# Patient Record
Sex: MALE | Race: BLACK OR AFRICAN AMERICAN | URBAN - METROPOLITAN AREA
[De-identification: names, ages, dates, MRNs, and addresses within clinical notes are randomized per-mention and may not be internally consistent; named-entity substitution may affect disease eponyms.]

---

## 2021-03-15 ENCOUNTER — OFFICE VISIT (OUTPATIENT)
Dept: INTERNAL MEDICINE CLINIC | Age: 31
End: 2021-03-15
Payer: MEDICAID

## 2021-03-15 VITALS
BODY MASS INDEX: 26.72 KG/M2 | OXYGEN SATURATION: 97 % | WEIGHT: 180.4 LBS | HEIGHT: 69 IN | SYSTOLIC BLOOD PRESSURE: 133 MMHG | RESPIRATION RATE: 18 BRPM | DIASTOLIC BLOOD PRESSURE: 85 MMHG | HEART RATE: 82 BPM | TEMPERATURE: 97.8 F

## 2021-03-15 DIAGNOSIS — N18.6 END STAGE RENAL DISEASE (HCC): ICD-10-CM

## 2021-03-15 DIAGNOSIS — E10.42 DIABETIC POLYNEUROPATHY ASSOCIATED WITH TYPE 1 DIABETES MELLITUS (HCC): ICD-10-CM

## 2021-03-15 DIAGNOSIS — E78.5 DYSLIPIDEMIA: ICD-10-CM

## 2021-03-15 DIAGNOSIS — E10.65 HYPERGLYCEMIA DUE TO TYPE 1 DIABETES MELLITUS (HCC): Primary | ICD-10-CM

## 2021-03-15 DIAGNOSIS — Z00.00 PHYSICAL EXAM: ICD-10-CM

## 2021-03-15 DIAGNOSIS — E10.3593: ICD-10-CM

## 2021-03-15 DIAGNOSIS — I10 ESSENTIAL HYPERTENSION: ICD-10-CM

## 2021-03-15 PROCEDURE — 99214 OFFICE O/P EST MOD 30 MIN: CPT | Performed by: INTERNAL MEDICINE

## 2021-03-15 RX ORDER — LABETALOL 200 MG/1
200 TABLET, FILM COATED ORAL 2 TIMES DAILY
COMMUNITY
Start: 2021-03-03

## 2021-03-15 RX ORDER — SEVELAMER CARBONATE 800 MG/1
800 TABLET, FILM COATED ORAL 2 TIMES DAILY
COMMUNITY
Start: 2021-03-03

## 2021-03-15 RX ORDER — TORSEMIDE 100 MG/1
100 TABLET ORAL 2 TIMES DAILY
COMMUNITY
Start: 2021-03-03

## 2021-03-15 RX ORDER — INSULIN GLARGINE 100 [IU]/ML
16 INJECTION, SOLUTION SUBCUTANEOUS DAILY
COMMUNITY
Start: 2021-03-08

## 2021-03-15 RX ORDER — INSULIN ASPART 100 [IU]/ML
INJECTION, SOLUTION INTRAVENOUS; SUBCUTANEOUS
COMMUNITY
Start: 2020-12-14

## 2021-03-15 NOTE — PROGRESS NOTES
[address] [place] [address] and Primary Care  [place] [address]  [address] [facility] [place] [facility] [address]  Phone:  [phone]  Fax: [phone]       Chief Complaint   Patient presents with    New Patient     history of Renal failure, HTN, COPD, elevated cholesterol, and diabetes. .      SUBJECTIVE:    Dianna Avery is a 31 y.o. male comes in as a new patient. He has had diabetes since age 7 and unfortunately developed significant amount of microvascular complications consisting of proliferative diabetic retinopathy, diabetic nephropathy, and diabetic neuropathy. His nephropathy has lead to end-stage renal disease with dialysis being initiated in July of last year. He remained on hemodialysis for initially two weeks and has been on PD thereafter. He has been adjusting the dialyzate accordingly. Unfortunately, his blood sugars have progressively increased since being on the dialyzate, which is not uncommon. He uses his cycler in the evening. There is a past history of primary hypertension as well as dyslipidemia. He has had no cardiovascular symptoms. Current Outpatient Medications   Medication Sig Dispense Refill    sevelamer carbonate (RENVELA) 800 mg tab tab Take 800 mg by mouth two (2) times a day.  Lantus Solostar U-100 Insulin 100 unit/mL (3 mL) inpn 16 Units by SubCUTAneous route daily.  labetaloL (NORMODYNE) 200 mg tablet Take 200 mg by mouth two (2) times a day.  torsemide (DEMADEX) 100 mg tablet Take 100 mg by mouth two (2) times a day.  glucose blood VI test strips (CONTOUR NEXT STRIPS) strip Use to check blood sugar 3 times a day 100 Strip 5    NovoLOG Flexpen U-100 Insulin 100 unit/mL (3 mL) inpn INJECT 8-12 UNITS subcutaneously WITH MEALS (max DOSE OF 50 units/day)      insulin NPH/insulin regular (NOVOLIN 70/30) 100 unit/mL (70-30) injection 40 Units by SubCUTAneous route.  40 U  Morning  30 U at dinner  Indications: TYPE 1 DIABETES MELLITUS      amLODIPine (NORVASC) 5 mg tablet Take 1 Tab by mouth daily. 27 Tab 2     Past Medical History:   Diagnosis Date    [facility] obstructive pulmonary disease ([facility]     Diabetes ([facility]     Hypercholesterolemia     Hypertension     Renal failure      History reviewed. No pertinent surgical history. No Known Allergies      REVIEW OF SYSTEMS:  General: negative for - chills or fever  ENT: negative for - headaches, nasal congestion or tinnitus  Respiratory: negative for - cough, hemoptysis, shortness of breath or wheezing  Cardiovascular : negative for - chest pain, edema, palpitations or shortness of breath  Gastrointestinal: negative for - abdominal pain, blood in stools, heartburn or nausea/vomiting  Genito-Urinary: no dysuria, trouble voiding, or hematuria  Musculoskeletal: negative for - gait disturbance, joint pain, joint stiffness or joint swelling  Neurological: no TIA or stroke symptoms  Hematologic: no bruises, no bleeding, no swollen glands  Integument: no lumps, mole changes, nail changes or rash  Endocrine: no malaise/lethargy or unexpected weight changes      Social History     Socioeconomic History    Marital status: UNKNOWN     Spouse name: Not on file    Number of children: Not on file    Years of education: Not on file    Highest education level: Not on file   Tobacco Use    Smoking status: Former Smoker     Packs/day: 0.50    Smokeless tobacco: Never Used   Substance and Sexual Activity    Alcohol use:  Yes     Alcohol/week: 3.0 standard drinks     Types: 3 Cans of beer per week    Drug use: No    Sexual activity: Yes     Family History   Problem Relation Age of Onset    Hypertension Mother        OBJECTIVE:    Visit Vitals  /85   Pulse 82   Temp 97.8 °F (36.6 °C) (Oral)   Resp 18   Ht 5' 9\" (1.753 m)   Wt 180 lb 6.4 oz (81.8 kg)   SpO2 97%   BMI 26.64 kg/m²     CONSTITUTIONAL: well , well nourished, appears age appropriate  EYES: perrla, eom intact  ENMT:moist mucous membranes, pharynx clear  NECK: supple. Thyroid normal  RESPIRATORY: Chest: clear to ascultation and percussion   CARDIOVASCULAR: Heart: regular rate and rhythm  GASTROINTESTINAL: Abdomen: soft, bowel sounds active  HEMATOLOGIC: no pathological lymph nodes palpated  MUSCULOSKELETAL: Extremities: no edema, pulse 1+   INTEGUMENT: No unusual rashes or suspicious skin lesions noted. Nails appear normal.  NEUROLOGIC: non-focal exam   MENTAL STATUS: alert and oriented, appropriate affect      ASSESSMENT:  1. Hyperglycemia due to type 1 diabetes mellitus ([address]    2. Proliferative diabetic retinopathy of both eyes associated with type 1 diabetes mellitus, macular edema presence unspecified ([address]    3. Diabetic polyneuropathy associated with type 1 diabetes mellitus ([address]    4. Essential hypertension    5. Dyslipidemia    6. End stage renal disease ([address]    7. Physical exam        PLAN:  1. As far as his diabetes is concerned, he will increase his Lantus from 16 to 24 units. He would titrate up every three days by 4 units until his fasting blood sugars are consistently lesser than 150.  2. He will follow up with his ophthalmologist for his proliferative diabetic retinopathy, requiring multiple injections, particularly in the area of the macula. 3. His diabetic polyneuropathy is stable, but he has an anesthetic foot most likely. 4. Blood pressure is reasonable today. 5. He remains on a statin in view of his increased cardiovascular risk.   6. He follows up with his nephrologist, but he could not give me the name of the individual.      .  Orders Placed This Encounter    HEMOGLOBIN A1C WITH EAG    APOLIPOPROTEIN B    CBC WITH AUTOMATED DIFF    LIPID PANEL    METABOLIC PANEL, COMPREHENSIVE    sevelamer carbonate (RENVELA) 800 mg tab tab    Lantus Solostar U-100 Insulin 100 unit/mL (3 mL) inpn    labetaloL (NORMODYNE) 200 mg tablet    torsemide (DEMADEX) 100 mg tablet    NovoLOG Flexpen U-100 Insulin 100 unit/mL (3 mL) inpn Follow-up and Dispositions    · Return in about 4 weeks (around 4/12/2021).            Ginna Bassett MD

## 2021-03-15 NOTE — PROGRESS NOTES
Chief Complaint   Patient presents with    New Patient     history of Renal failure, HTN, COPD, elevated cholesterol, and diabetes. 1. Have you been to the ER, urgent care clinic since your last visit? Hospitalized since your last visit? Yes When: 1/18/21 Where: [facility] Reason for visit: diabetes     2. Have you seen or consulted any other health care providers outside of the [address] [place] [address] since your last visit? Include any pap smears or colon screening.  No

## 2021-03-19 LAB
ALBUMIN SERPL-MCNC: 3.9 G/DL (ref 4.1–5.2)
ALBUMIN/GLOB SERPL: 1.5 {RATIO} (ref 1.2–2.2)
ALP SERPL-CCNC: 138 IU/L (ref 39–117)
ALT SERPL-CCNC: 30 IU/L (ref 0–44)
APO B SERPL-MCNC: 91 MG/DL
AST SERPL-CCNC: 26 IU/L (ref 0–40)
BASOPHILS # BLD AUTO: NORMAL 10*3/UL
BILIRUB SERPL-MCNC: <0.2 MG/DL (ref 0–1.2)
BUN SERPL-MCNC: 46 MG/DL (ref 6–20)
BUN/CREAT SERPL: 4 (ref 9–20)
CALCIUM SERPL-MCNC: 9.4 MG/DL (ref 8.7–10.2)
CHLORIDE SERPL-SCNC: 99 MMOL/L (ref 96–106)
CHOLEST SERPL-MCNC: 202 MG/DL (ref 100–199)
CO2 SERPL-SCNC: 30 MMOL/L (ref 20–29)
CREAT SERPL-MCNC: 12.08 MG/DL (ref 0.76–1.27)
EOSINOPHIL # BLD AUTO: NORMAL 10*3/UL
EOSINOPHIL NFR BLD AUTO: NORMAL %
GLOBULIN SER CALC-MCNC: 2.6 G/DL (ref 1.5–4.5)
GLUCOSE SERPL-MCNC: 102 MG/DL (ref 65–99)
HCT VFR BLD AUTO: NORMAL %
HDLC SERPL-MCNC: 76 MG/DL
HGB BLD-MCNC: NORMAL G/DL
LDLC SERPL CALC-MCNC: 115 MG/DL (ref 0–99)
LYMPHOCYTES # BLD AUTO: NORMAL 10*3/UL
LYMPHOCYTES NFR BLD AUTO: NORMAL %
MONOCYTES NFR BLD AUTO: NORMAL %
NEUTROPHILS NFR BLD AUTO: NORMAL %
PLATELET # BLD AUTO: NORMAL 10*3/UL
POTASSIUM SERPL-SCNC: 4.6 MMOL/L (ref 3.5–5.2)
PROT SERPL-MCNC: 6.5 G/DL (ref 6–8.5)
RBC # BLD AUTO: NORMAL 10*6/UL
SODIUM SERPL-SCNC: 146 MMOL/L (ref 134–144)
TRIGL SERPL-MCNC: 63 MG/DL (ref 0–149)
VLDLC SERPL CALC-MCNC: 11 MG/DL (ref 5–40)
WBC # BLD AUTO: NORMAL X10E3/UL

## 2021-03-28 NOTE — PROGRESS NOTES
Patient needs to return for a hemoglobin A1c and CBC------ he has a very difficult stick so we will have to be someone with skills

## 2021-10-03 ENCOUNTER — HOSPITAL ENCOUNTER (EMERGENCY)
Age: 31
Discharge: HOME HEALTH | End: 2021-10-04
Attending: EMERGENCY MEDICINE
Payer: MEDICARE

## 2021-10-03 DIAGNOSIS — N18.6 END STAGE RENAL DISEASE ON DIALYSIS (HCC): ICD-10-CM

## 2021-10-03 DIAGNOSIS — Z99.2 END STAGE RENAL DISEASE ON DIALYSIS (HCC): ICD-10-CM

## 2021-10-03 DIAGNOSIS — E16.2 HYPOGLYCEMIA: Primary | ICD-10-CM

## 2021-10-03 LAB
ALBUMIN SERPL-MCNC: 3.2 G/DL (ref 3.5–5)
ALBUMIN/GLOB SERPL: 0.8 {RATIO} (ref 1.1–2.2)
ALP SERPL-CCNC: 93 U/L (ref 45–117)
ALT SERPL-CCNC: 33 U/L (ref 12–78)
ANION GAP SERPL CALC-SCNC: 9 MMOL/L (ref 5–15)
AST SERPL-CCNC: 19 U/L (ref 15–37)
BASOPHILS # BLD: 0.1 K/UL (ref 0–0.1)
BASOPHILS NFR BLD: 1 % (ref 0–1)
BILIRUB SERPL-MCNC: 0.4 MG/DL (ref 0.2–1)
BUN SERPL-MCNC: 64 MG/DL (ref 6–20)
BUN/CREAT SERPL: 4 (ref 12–20)
CALCIUM SERPL-MCNC: 8.6 MG/DL (ref 8.5–10.1)
CHLORIDE SERPL-SCNC: 102 MMOL/L (ref 97–108)
CO2 SERPL-SCNC: 27 MMOL/L (ref 21–32)
COMMENT, HOLDF: NORMAL
CREAT SERPL-MCNC: 14.5 MG/DL (ref 0.7–1.3)
DIFFERENTIAL METHOD BLD: ABNORMAL
EOSINOPHIL # BLD: 0.8 K/UL (ref 0–0.4)
EOSINOPHIL NFR BLD: 11 % (ref 0–7)
ERYTHROCYTE [DISTWIDTH] IN BLOOD BY AUTOMATED COUNT: 15.7 % (ref 11.5–14.5)
GLOBULIN SER CALC-MCNC: 3.9 G/DL (ref 2–4)
GLUCOSE BLD STRIP.AUTO-MCNC: 137 MG/DL (ref 65–117)
GLUCOSE BLD STRIP.AUTO-MCNC: 147 MG/DL (ref 65–117)
GLUCOSE BLD STRIP.AUTO-MCNC: 174 MG/DL (ref 65–117)
GLUCOSE BLD STRIP.AUTO-MCNC: 38 MG/DL (ref 65–117)
GLUCOSE BLD STRIP.AUTO-MCNC: 54 MG/DL (ref 65–117)
GLUCOSE BLD STRIP.AUTO-MCNC: 86 MG/DL (ref 65–117)
GLUCOSE SERPL-MCNC: 105 MG/DL (ref 65–100)
HCT VFR BLD AUTO: 42.7 % (ref 36.6–50.3)
HGB BLD-MCNC: 14 G/DL (ref 12.1–17)
IMM GRANULOCYTES # BLD AUTO: 0 K/UL (ref 0–0.04)
IMM GRANULOCYTES NFR BLD AUTO: 0 % (ref 0–0.5)
LYMPHOCYTES # BLD: 1.4 K/UL (ref 0.8–3.5)
LYMPHOCYTES NFR BLD: 21 % (ref 12–49)
MAGNESIUM SERPL-MCNC: 2.5 MG/DL (ref 1.6–2.4)
MCH RBC QN AUTO: 29.4 PG (ref 26–34)
MCHC RBC AUTO-ENTMCNC: 32.8 G/DL (ref 30–36.5)
MCV RBC AUTO: 89.5 FL (ref 80–99)
MONOCYTES # BLD: 0.6 K/UL (ref 0–1)
MONOCYTES NFR BLD: 8 % (ref 5–13)
NEUTS SEG # BLD: 4 K/UL (ref 1.8–8)
NEUTS SEG NFR BLD: 59 % (ref 32–75)
NRBC # BLD: 0 K/UL (ref 0–0.01)
NRBC BLD-RTO: 0 PER 100 WBC
PLATELET # BLD AUTO: 324 K/UL (ref 150–400)
PMV BLD AUTO: 9.6 FL (ref 8.9–12.9)
POTASSIUM SERPL-SCNC: 4.4 MMOL/L (ref 3.5–5.1)
PROT SERPL-MCNC: 7.1 G/DL (ref 6.4–8.2)
RBC # BLD AUTO: 4.77 M/UL (ref 4.1–5.7)
SAMPLES BEING HELD,HOLD: NORMAL
SERVICE CMNT-IMP: ABNORMAL
SERVICE CMNT-IMP: NORMAL
SODIUM SERPL-SCNC: 138 MMOL/L (ref 136–145)
WBC # BLD AUTO: 6.8 K/UL (ref 4.1–11.1)

## 2021-10-03 PROCEDURE — 96366 THER/PROPH/DIAG IV INF ADDON: CPT

## 2021-10-03 PROCEDURE — 83735 ASSAY OF MAGNESIUM: CPT

## 2021-10-03 PROCEDURE — 80053 COMPREHEN METABOLIC PANEL: CPT

## 2021-10-03 PROCEDURE — 96365 THER/PROPH/DIAG IV INF INIT: CPT

## 2021-10-03 PROCEDURE — 99285 EMERGENCY DEPT VISIT HI MDM: CPT

## 2021-10-03 PROCEDURE — 82962 GLUCOSE BLOOD TEST: CPT

## 2021-10-03 PROCEDURE — 96375 TX/PRO/DX INJ NEW DRUG ADDON: CPT

## 2021-10-03 PROCEDURE — 85025 COMPLETE CBC W/AUTO DIFF WBC: CPT

## 2021-10-03 PROCEDURE — 74011000250 HC RX REV CODE- 250

## 2021-10-03 PROCEDURE — 74011000250 HC RX REV CODE- 250: Performed by: EMERGENCY MEDICINE

## 2021-10-03 PROCEDURE — 93005 ELECTROCARDIOGRAM TRACING: CPT

## 2021-10-03 RX ORDER — DEXTROSE 50 % IN WATER (D50W) INTRAVENOUS SYRINGE
Status: COMPLETED
Start: 2021-10-03 | End: 2021-10-03

## 2021-10-03 RX ORDER — DEXTROSE 50 % IN WATER (D50W) INTRAVENOUS SYRINGE
50
Status: COMPLETED | OUTPATIENT
Start: 2021-10-03 | End: 2021-10-03

## 2021-10-03 RX ORDER — DEXTROSE MONOHYDRATE AND SODIUM CHLORIDE 5; .45 G/100ML; G/100ML
125 INJECTION, SOLUTION INTRAVENOUS CONTINUOUS
Status: DISCONTINUED | OUTPATIENT
Start: 2021-10-03 | End: 2021-10-04 | Stop reason: HOSPADM

## 2021-10-03 RX ADMIN — DEXTROSE MONOHYDRATE 25 G: 25 INJECTION, SOLUTION INTRAVENOUS at 20:32

## 2021-10-03 RX ADMIN — DEXTROSE AND SODIUM CHLORIDE 125 ML/HR: 5; 450 INJECTION, SOLUTION INTRAVENOUS at 20:03

## 2021-10-03 RX ADMIN — DEXTROSE MONOHYDRATE 25 G: 25 INJECTION, SOLUTION INTRAVENOUS at 19:05

## 2021-10-03 NOTE — ED TRIAGE NOTES
Pt arrived with EMS from home after being found unconscious by his mother. BS 20 upon EMs arrival. Pt received Dextrose with EMS, repeat  BS 74. PMHx of type 1 DM - insulin dependent, ESRD - dialysis. VSS otherwise. Pt awake, alert and oriented X 4 now.

## 2021-10-04 VITALS
OXYGEN SATURATION: 99 % | WEIGHT: 169 LBS | DIASTOLIC BLOOD PRESSURE: 92 MMHG | RESPIRATION RATE: 14 BRPM | HEART RATE: 91 BPM | BODY MASS INDEX: 25.03 KG/M2 | SYSTOLIC BLOOD PRESSURE: 152 MMHG | HEIGHT: 69 IN | TEMPERATURE: 98.2 F

## 2021-10-04 LAB
ATRIAL RATE: 58 BPM
CALCULATED P AXIS, ECG09: 46 DEGREES
CALCULATED R AXIS, ECG10: 53 DEGREES
CALCULATED T AXIS, ECG11: 29 DEGREES
DIAGNOSIS, 93000: NORMAL
GLUCOSE BLD STRIP.AUTO-MCNC: 211 MG/DL (ref 65–117)
GLUCOSE BLD STRIP.AUTO-MCNC: 232 MG/DL (ref 65–117)
GLUCOSE BLD STRIP.AUTO-MCNC: 240 MG/DL (ref 65–117)
GLUCOSE BLD STRIP.AUTO-MCNC: 303 MG/DL (ref 65–117)
P-R INTERVAL, ECG05: 170 MS
Q-T INTERVAL, ECG07: 490 MS
QRS DURATION, ECG06: 116 MS
QTC CALCULATION (BEZET), ECG08: 481 MS
SERVICE CMNT-IMP: ABNORMAL
VENTRICULAR RATE, ECG03: 58 BPM

## 2021-10-04 PROCEDURE — 96366 THER/PROPH/DIAG IV INF ADDON: CPT

## 2021-10-04 PROCEDURE — 82962 GLUCOSE BLOOD TEST: CPT

## 2021-10-04 NOTE — ED NOTES
Assumed care of patient at change of shift. Patient was initially seen in the ED for low blood sugar. He was not fed a meal but was put on D5 drip. He is now since had something to eat. The D5 drip was stopped. And we are monitoring to see if he can maintain his blood sugar. Yordan Easton MD  3:09 AM  Patient states he feels fine. He isn't sure why his sugar dropped yesterday- states he had been feeling fine. He states he only ate crackers here so far. Will provide meal. Will continue to monitor, if sugars stay up will d/c in morning. Patient continues to feel well and maintain his blood sugars. He is again eating. Will d/c to follow up with his pcp/endocrinologist- frequent check of sugars today, encouraged him to eat all his meals, return if recurrent drops in sugar.

## 2021-10-04 NOTE — DISCHARGE INSTRUCTIONS
Be sure to eat all your meals. Check your blood sugar frequently throughout the day today. Follow up with your diabetes doctor (or primary care doctor if they manage you diabetes) today to discuss any adjustments in your insulin. If you have any further drops in your blood sugar, return to the ED.

## 2021-10-04 NOTE — ED NOTES
Emergency Room Nursing Note         Patient Name: Marie Alva       : 1990               MRN: [id number]       Admit Diagnosis: No admission diagnoses are documented for this encounter. Surgery: * No surgery found *           Per Dr. Geri Quiles d/c D5 1/2NS, to see if pt will maintain Blood Glucose.          Lines:   Peripheral IV 10/03/21 Right Antecubital (Active)   Site Assessment Clean, dry, & intact 10/03/21 1911   Phlebitis Assessment 0 10/03/21 1911   Infiltration Assessment 0 10/03/21 1911   Dressing Status Clean, dry, & intact 10/03/21 1911   Hub Color/Line Status Pink 10/03/21 1911   Action Taken Blood drawn 10/03/21 1911          Signed by: Patsy Bolton RN, BSN, CMSRN                                              10/4/2021 at 1:11 AM

## 2021-10-04 NOTE — ED NOTES
Emergency Room Nursing Note         Patient Name: Damon Brewster       : 1990               MRN: [id number]       Admit Diagnosis: No admission diagnoses are documented for this encounter. Surgery: * No surgery found *           Patient discharged with discharge paperwork, patient stable and not in distress.          Lines:         Signed by: Fatmata Haley RN, BSN, CMSRN                                              10/4/2021 at 7:11 AM

## 2021-10-04 NOTE — ED PROVIDER NOTES
Patient is a 81-year-old male with past medical history significant for type 1 diabetes and hyperlipidemia, hypertension and end-stage renal disease on peritoneal dialysis who presents emergency department with hypoglycemia. He states he has insulin and last ate lunch. Patient did receive dextrose in route but then dropped again into the 30s. Patient denies any other complaint. Past Medical History:   Diagnosis Date    [facility] disease [facility]     Diabetes [facility]     Hypercholesterolemia     Hypertension     Renal failure        History reviewed. No pertinent surgical history. Family History:   Problem Relation Age of Onset    Hypertension Mother        Social History     Socioeconomic History    Marital status: UNKNOWN     Spouse name: Not on file    Number of children: Not on file    Years of education: Not on file    Highest education level: Not on file   Occupational History    Not on file   Tobacco Use    Smoking status: Former Smoker     Packs/day: 0.50    Smokeless tobacco: Never Used   Vaping Use    Vaping Use: Never used   Substance and Sexual Activity    Alcohol use: Yes     Alcohol/week: 3.0 standard drinks     Types: 3 Cans of beer per week    Drug use: No    Sexual activity: Yes   Other Topics Concern    Not on file   Social History Narrative    Not on file     Social Determinants of Health     Financial Resource Strain:     Difficulty of Paying Living Expenses:    Food Insecurity:     Worried About Running Out of Food in the Last Year:     [address] in the Last Year:    Transportation Needs:     Lack of Transportation (Medical):      Lack of Transportation (Non-Medical):    Physical Activity:     Days of Exercise per Week:     Minutes of Exercise per Session:    Stress:     Feeling of Stress :    Social Connections:     Frequency of Communication with Friends and Family:     Frequency of Social Gatherings with Friends and Family:  Attends Bahai Services:     Active Member of Clubs or Organizations:     Attends Club or Organization Meetings:     Marital Status:    Intimate Partner Violence:     Fear of Current or Ex-Partner:     Emotionally Abused:     Physically Abused:     Sexually Abused: ALLERGIES: Patient has no known allergies. Review of Systems   Constitutional: Positive for diaphoresis (When hypoglycemic.). Negative for fever. HENT: Negative for trouble swallowing. Eyes: Negative for photophobia. Respiratory: Negative for shortness of breath. Cardiovascular: Negative for chest pain. Gastrointestinal: Negative for abdominal pain. Musculoskeletal: Negative for neck pain and neck stiffness. Skin: Negative for rash. Neurological: Negative for facial asymmetry. Hematological: Does not bruise/bleed easily. Psychiatric/Behavioral: Negative. Vitals:    10/03/21 1905 10/03/21 2015 10/03/21 2030   BP: 124/88 111/84 120/87   Pulse: 60 (!) 53 (!) 57   Resp: 13 12 11   Temp: 97.8 °F (36.6 °C)     SpO2: 100% 100% 99%   Weight: 76.7 kg (169 lb)     Height: 5' 9\" (1.753 m)              Physical Exam  Vitals and nursing note reviewed. Constitutional:       Appearance: He is diaphoretic. He is not toxic-appearing. Comments: Appears older than stated age, drowsy initially when hypoglycemic however became alert after dextrose administration. HENT:      Head: Normocephalic and atraumatic. Right Ear: External ear normal.      Left Ear: External ear normal.   Eyes:      Comments: Mild periorbital swelling consistent with being dialysis patient. Cardiovascular:      Rate and Rhythm: Normal rate. Pulses: Normal pulses. Pulmonary:      Breath sounds: Normal breath sounds. Abdominal:      General: Abdomen is flat. Palpations: Abdomen is soft. Comments: Peritoneal dialysis catheter in place without obvious complicating feature.    Musculoskeletal:         General: No tenderness. Cervical back: Neck supple. Neurological:      Mental Status: He is oriented to person, place, and time. Psychiatric:         Mood and Affect: Mood normal.         Behavior: Behavior normal.          [facility]  ED Course as of Oct 04 0019   Moy Barnes Oct 03, 2021   2025 EKG obtained at [facility] showing sinus bradycardia, rate 78,No prior EKG available for comparison. Incomplete left bundle branch block, prolonged QT,    [JE]      ED Course User Index  [JE] Stephanie Napoles MD       Procedures        12:21 AM  Change of shift. Care of patient signed over to DR. Srinivasan. Bedside handoff complete. Awaiting further observation and disposition based on repeat point-of-care glucose test..

## 2021-10-04 NOTE — ED NOTES
Emergency Room Nursing Note         Patient Name: Betzy Granda       : 1990               MRN: [id number]       Admit Diagnosis: No admission diagnoses are documented for this encounter. Surgery: * No surgery found *           Patient provided microwave able dinner, patient to call family for pick-up.          Lines:   Peripheral IV 10/03/21 Right Antecubital (Active)   Site Assessment Clean, dry, & intact 10/03/21 1911   Phlebitis Assessment 0 10/03/21 1911   Infiltration Assessment 0 10/03/21 1911   Dressing Status Clean, dry, & intact 10/03/21 1911   Hub Color/Line Status Pink 10/03/21 1911   Action Taken Blood drawn 10/03/21 1911          Signed by: Hal Antoine RN, BSN, CMSRN                                              10/4/2021 at 5:09 AM

## 2021-10-20 LAB — HBA1C MFR BLD HPLC: 7.6 %

## 2022-03-09 LAB — HBA1C MFR BLD HPLC: 8.7 %

## 2022-03-18 PROBLEM — E10.65 HYPERGLYCEMIA DUE TO TYPE 1 DIABETES MELLITUS (HCC): Status: ACTIVE | Noted: 2021-03-15

## 2022-03-18 PROBLEM — E78.5 DYSLIPIDEMIA: Status: ACTIVE | Noted: 2021-03-15

## 2022-03-19 PROBLEM — I10 ESSENTIAL HYPERTENSION: Status: ACTIVE | Noted: 2021-03-15

## 2022-03-19 PROBLEM — E10.42 DIABETIC POLYNEUROPATHY ASSOCIATED WITH TYPE 1 DIABETES MELLITUS (HCC): Status: ACTIVE | Noted: 2021-03-15

## 2022-03-19 PROBLEM — E10.3593 PROLIFERATIVE DIABETIC RETINOPATHY OF BOTH EYES ASSOCIATED WITH TYPE 1 DIABETES MELLITUS (HCC): Status: ACTIVE | Noted: 2021-03-15

## 2022-03-19 PROBLEM — N18.6 END STAGE RENAL DISEASE (HCC): Status: ACTIVE | Noted: 2021-03-15

## 2022-07-09 ENCOUNTER — HOSPITAL ENCOUNTER (EMERGENCY)
Age: 32
Discharge: HOME | End: 2022-07-09
Attending: EMERGENCY MEDICINE
Payer: MEDICARE

## 2022-07-09 VITALS
HEART RATE: 92 BPM | BODY MASS INDEX: 24.96 KG/M2 | OXYGEN SATURATION: 100 % | RESPIRATION RATE: 15 BRPM | HEIGHT: 69 IN | TEMPERATURE: 97.7 F | SYSTOLIC BLOOD PRESSURE: 155 MMHG | DIASTOLIC BLOOD PRESSURE: 103 MMHG

## 2022-07-09 DIAGNOSIS — E16.2 HYPOGLYCEMIA: Primary | ICD-10-CM

## 2022-07-09 DIAGNOSIS — T68.XXXA HYPOTHERMIA, INITIAL ENCOUNTER: ICD-10-CM

## 2022-07-09 LAB
ALBUMIN SERPL-MCNC: 3.1 G/DL (ref 3.5–5)
ALBUMIN/GLOB SERPL: 0.8 {RATIO} (ref 1.1–2.2)
ALP SERPL-CCNC: 93 U/L (ref 45–117)
ALT SERPL-CCNC: 33 U/L (ref 12–78)
ANION GAP SERPL CALC-SCNC: 10 MMOL/L (ref 5–15)
AST SERPL-CCNC: 16 U/L (ref 15–37)
BASOPHILS # BLD: 0.1 K/UL (ref 0–0.1)
BASOPHILS NFR BLD: 1 % (ref 0–1)
BILIRUB SERPL-MCNC: 0.4 MG/DL (ref 0.2–1)
BUN SERPL-MCNC: 39 MG/DL (ref 6–20)
BUN/CREAT SERPL: 2 (ref 12–20)
CALCIUM SERPL-MCNC: 8.6 MG/DL (ref 8.5–10.1)
CHLORIDE SERPL-SCNC: 98 MMOL/L (ref 97–108)
CO2 SERPL-SCNC: 29 MMOL/L (ref 21–32)
COMMENT, HOLDF: NORMAL
CREAT SERPL-MCNC: 16.4 MG/DL (ref 0.7–1.3)
DIFFERENTIAL METHOD BLD: ABNORMAL
EOSINOPHIL # BLD: 1 K/UL (ref 0–0.4)
EOSINOPHIL NFR BLD: 13 % (ref 0–7)
ERYTHROCYTE [DISTWIDTH] IN BLOOD BY AUTOMATED COUNT: 13.2 % (ref 11.5–14.5)
GLOBULIN SER CALC-MCNC: 3.7 G/DL (ref 2–4)
GLUCOSE BLD STRIP.AUTO-MCNC: 166 MG/DL (ref 65–117)
GLUCOSE BLD STRIP.AUTO-MCNC: 265 MG/DL (ref 65–117)
GLUCOSE BLD STRIP.AUTO-MCNC: 338 MG/DL (ref 65–117)
GLUCOSE SERPL-MCNC: 174 MG/DL (ref 65–100)
HCT VFR BLD AUTO: 37.1 % (ref 36.6–50.3)
HGB BLD-MCNC: 12.4 G/DL (ref 12.1–17)
IMM GRANULOCYTES # BLD AUTO: 0 K/UL (ref 0–0.04)
IMM GRANULOCYTES NFR BLD AUTO: 0 % (ref 0–0.5)
LACTATE BLD-SCNC: 1.11 MMOL/L (ref 0.4–2)
LYMPHOCYTES # BLD: 1.4 K/UL (ref 0.8–3.5)
LYMPHOCYTES NFR BLD: 18 % (ref 12–49)
MCH RBC QN AUTO: 30.2 PG (ref 26–34)
MCHC RBC AUTO-ENTMCNC: 33.4 G/DL (ref 30–36.5)
MCV RBC AUTO: 90.3 FL (ref 80–99)
MONOCYTES # BLD: 0.4 K/UL (ref 0–1)
MONOCYTES NFR BLD: 5 % (ref 5–13)
NEUTS SEG # BLD: 5 K/UL (ref 1.8–8)
NEUTS SEG NFR BLD: 63 % (ref 32–75)
NRBC # BLD: 0 K/UL (ref 0–0.01)
NRBC BLD-RTO: 0 PER 100 WBC
PLATELET # BLD AUTO: 295 K/UL (ref 150–400)
PMV BLD AUTO: 9.2 FL (ref 8.9–12.9)
POTASSIUM SERPL-SCNC: 3.9 MMOL/L (ref 3.5–5.1)
PROT SERPL-MCNC: 6.8 G/DL (ref 6.4–8.2)
RBC # BLD AUTO: 4.11 M/UL (ref 4.1–5.7)
RBC MORPH BLD: ABNORMAL
SAMPLES BEING HELD,HOLD: NORMAL
SERVICE CMNT-IMP: ABNORMAL
SODIUM SERPL-SCNC: 137 MMOL/L (ref 136–145)
WBC # BLD AUTO: 7.9 K/UL (ref 4.1–11.1)

## 2022-07-09 PROCEDURE — 82962 GLUCOSE BLOOD TEST: CPT

## 2022-07-09 PROCEDURE — 36415 COLL VENOUS BLD VENIPUNCTURE: CPT

## 2022-07-09 PROCEDURE — 99284 EMERGENCY DEPT VISIT MOD MDM: CPT

## 2022-07-09 PROCEDURE — 85025 COMPLETE CBC W/AUTO DIFF WBC: CPT

## 2022-07-09 PROCEDURE — 83605 ASSAY OF LACTIC ACID: CPT

## 2022-07-09 PROCEDURE — 80053 COMPREHEN METABOLIC PANEL: CPT

## 2022-07-09 PROCEDURE — 74011250636 HC RX REV CODE- 250/636: Performed by: EMERGENCY MEDICINE

## 2022-07-09 PROCEDURE — 96360 HYDRATION IV INFUSION INIT: CPT

## 2022-07-09 PROCEDURE — 87040 BLOOD CULTURE FOR BACTERIA: CPT

## 2022-07-09 RX ADMIN — SODIUM CHLORIDE 1000 ML: 9 INJECTION, SOLUTION INTRAVENOUS at 20:14

## 2022-07-09 NOTE — ED PROVIDER NOTES
HPI       36y M here by EMS with hypoglycemia. GCS 10 on EMS arrival. Sugar 52. Given dextrose IV en route and now back to normal and no complaints. Says he woke around noon today. Took insulin. Checked sugar in the afternoon again and 170's. Gave more insulin and started to eat but doesn't recall much after that. Takes lantus at night but didn't take his daily dose yet - only had his short-acting. Does PD at home. No abdominal pain. No nausea or vomiting. No recent illnesses. No fever. No rash. Temp was cold per EMS en route. Past Medical History:   Diagnosis Date    [facility]     Diabetes ([facility]     Hypercholesterolemia     Hypertension     Renal failure        No past surgical history on file. Family History:   Problem Relation Age of Onset    Hypertension Mother        Social History     Socioeconomic History    Marital status: UNKNOWN     Spouse name: Not on file    Number of children: Not on file    Years of education: Not on file    Highest education level: Not on file   Occupational History    Not on file   Tobacco Use    Smoking status: Former Smoker     Packs/day: 0.50    Smokeless tobacco: Never Used   Vaping Use    Vaping Use: Never used   Substance and Sexual Activity    Alcohol use: Yes     Alcohol/week: 3.0 standard drinks     Types: 3 Cans of beer per week    Drug use: No    Sexual activity: Yes   Other Topics Concern    Not on file   Social History Narrative    Not on file     Social Determinants of Health     Financial Resource Strain:     Difficulty of Paying Living Expenses: Not on file   Food Insecurity:     Worried About Running Out of Food in the Last Year: Not on file    Isela of Food in the Last Year: Not on file   Transportation Needs:     Lack of Transportation (Medical): Not on file    Lack of Transportation (Non-Medical):  Not on file   Physical Activity:     Days of Exercise per Week: Not on file    Minutes of Exercise per Session: Not on file   Stress:     Feeling of Stress : Not on file   Social Connections:     Frequency of Communication with Friends and Family: Not on file    Frequency of Social Gatherings with Friends and Family: Not on file    Attends Alevism Services: Not on file    Active Member of Clubs or Organizations: Not on file    Attends Club or Organization Meetings: Not on file    Marital Status: Not on file   Intimate Partner Violence:     Fear of Current or Ex-Partner: Not on file    Emotionally Abused: Not on file    Physically Abused: Not on file    Sexually Abused: Not on file   Housing Stability:     Unable to Pay for Housing in the Last Year: Not on file    Number of [place] in the Last Year: Not on file    Unstable Housing in the Last Year: Not on file         ALLERGIES: Patient has no known allergies. Review of Systems   Review of Systems   Constitutional: (-) weight loss. HEENT: (-) stiff neck   Eyes: (-) discharge. Respiratory: (-) cough. Cardiovascular: (-) syncope. Gastrointestinal: (-) blood in stool. Genitourinary: (-) hematuria. Musculoskeletal: (-) myalgias. Neurological: (-) seizure. Skin: (-) petechiae  Lymph/Immunologic: (-) enlarged lymph nodes  All other systems reviewed and are negative. Vitals:    07/09/22 1955   BP: (!) 171/112   Pulse: 86   Resp: 16   Temp: (!) 94.1 °F (34.5 °C)   SpO2: 100%   Height: 5' 9\" (1.753 m)            Physical Exam Nursing note and vitals reviewed. Constitutional: oriented to person, place, and time. appears well-developed and well-nourished. No distress. Head: Normocephalic and atraumatic. Sclera anicteric  Nose: No rhinorrhea  Mouth/Throat: Oropharynx is clear and moist. Pharynx normal  Eyes: Conjunctivae are normal. Pupils are equal, round, and reactive to light. Right eye exhibits no discharge. Left eye exhibits no discharge. Neck: Painless normal range of motion. Neck supple. No LAD.   Cardiovascular: Normal rate, regular rhythm, normal heart sounds and intact distal pulses. Exam reveals no gallop and no friction rub. No murmur heard. Pulmonary/Chest:  No respiratory distress. No wheezes. No rales. No rhonchi. No increased work of breathing. No accessory muscle use. Good air exchange throughout. Abdominal: soft, non-tender, no rebound or guarding. No hepatosplenomegaly. Normal bowel sounds throughout. Back: no tenderness to palpation, no deformities, no CVA tenderness  Extremities/Musculoskeletal: Normal range of motion. no tenderness. No edema. Distal extremities are neurovasc intact. Lymphadenopathy:   No adenopathy. Neurological:  Alert and oriented to person, place, and time. Coordination normal. CN 2-12 intact. Motor and sensory function intact. Skin: Skin is warm and dry. No rash noted. No pallor. MDM 36y M here with hypoglycemia. Unclear etiology but suspect this is what caused his hypothermia. Plan for labs, fluids, will check core temp, milan hugger, and serial accuchecks.        Procedures

## 2022-07-09 NOTE — ED NOTES
46 - ED Attending physician at bedside for eval;;    2031 - applied milan hugger at this time;;    Had to obtain blankets from different department leading to delay

## 2022-07-10 NOTE — ED NOTES
Care assumed from Dr. Sheila Gottlieb at [phone]. Please see his note for full H&P.  28-year-old male with history of DM presented with episode of hypoglycemia. Symptoms resolved after glucose was replenished by EMS. He has had multiple readings of his glucose in the ED with no further episodes of hypoglycemia. He was hypothermic on arrival but warmed up nicely. He states that he is now feeling much better and would like to be discharged home. Recommended PCP follow-up for further evaluation of his medication regimen. Return precautions were given for worsening or concerns.

## 2022-07-14 LAB
BACTERIA SPEC CULT: NORMAL
SERVICE CMNT-IMP: NORMAL

## 2022-08-12 ENCOUNTER — HOSPITAL ENCOUNTER (EMERGENCY)
Age: 32
Discharge: HOME | End: 2022-08-12
Attending: EMERGENCY MEDICINE
Payer: MEDICARE

## 2022-08-12 VITALS
OXYGEN SATURATION: 100 % | HEIGHT: 70 IN | TEMPERATURE: 98.4 F | DIASTOLIC BLOOD PRESSURE: 118 MMHG | BODY MASS INDEX: 24.25 KG/M2 | RESPIRATION RATE: 20 BRPM | SYSTOLIC BLOOD PRESSURE: 178 MMHG | HEART RATE: 94 BPM

## 2022-08-12 DIAGNOSIS — R11.2 NAUSEA AND VOMITING, UNSPECIFIED VOMITING TYPE: ICD-10-CM

## 2022-08-12 DIAGNOSIS — R73.9 HYPERGLYCEMIA: ICD-10-CM

## 2022-08-12 DIAGNOSIS — E87.6 HYPOKALEMIA: Primary | ICD-10-CM

## 2022-08-12 LAB
ALBUMIN SERPL-MCNC: 3.2 G/DL (ref 3.5–5)
ALBUMIN/GLOB SERPL: 0.9 {RATIO} (ref 1.1–2.2)
ALP SERPL-CCNC: 88 U/L (ref 45–117)
ALT SERPL-CCNC: 25 U/L (ref 12–78)
ANION GAP SERPL CALC-SCNC: 11 MMOL/L (ref 5–15)
AST SERPL-CCNC: 15 U/L (ref 15–37)
BASE EXCESS BLDV CALC-SCNC: 5.5 MMOL/L
BASOPHILS # BLD: 0.1 K/UL (ref 0–0.1)
BASOPHILS NFR BLD: 1 % (ref 0–1)
BDY SITE: ABNORMAL
BILIRUB SERPL-MCNC: 0.3 MG/DL (ref 0.2–1)
BUN SERPL-MCNC: 45 MG/DL (ref 6–20)
BUN/CREAT SERPL: 3 (ref 12–20)
CALCIUM SERPL-MCNC: 9.2 MG/DL (ref 8.5–10.1)
CHLORIDE SERPL-SCNC: 99 MMOL/L (ref 97–108)
CO2 SERPL-SCNC: 30 MMOL/L (ref 21–32)
CREAT SERPL-MCNC: 16.21 MG/DL (ref 0.7–1.3)
DIFFERENTIAL METHOD BLD: ABNORMAL
EOSINOPHIL # BLD: 0.4 K/UL (ref 0–0.4)
EOSINOPHIL NFR BLD: 5 % (ref 0–7)
ERYTHROCYTE [DISTWIDTH] IN BLOOD BY AUTOMATED COUNT: 13.1 % (ref 11.5–14.5)
GLOBULIN SER CALC-MCNC: 3.7 G/DL (ref 2–4)
GLUCOSE BLD STRIP.AUTO-MCNC: 376 MG/DL (ref 65–117)
GLUCOSE SERPL-MCNC: 377 MG/DL (ref 65–100)
HCO3 BLDV-SCNC: 31 MMOL/L (ref 23–28)
HCT VFR BLD AUTO: 36.5 % (ref 36.6–50.3)
HGB BLD-MCNC: 12.5 G/DL (ref 12.1–17)
IMM GRANULOCYTES # BLD AUTO: 0 K/UL (ref 0–0.04)
IMM GRANULOCYTES NFR BLD AUTO: 0 % (ref 0–0.5)
LYMPHOCYTES # BLD: 1.6 K/UL (ref 0.8–3.5)
LYMPHOCYTES NFR BLD: 18 % (ref 12–49)
MCH RBC QN AUTO: 29.8 PG (ref 26–34)
MCHC RBC AUTO-ENTMCNC: 34.2 G/DL (ref 30–36.5)
MCV RBC AUTO: 87.1 FL (ref 80–99)
MONOCYTES # BLD: 0.4 K/UL (ref 0–1)
MONOCYTES NFR BLD: 4 % (ref 5–13)
NEUTS SEG # BLD: 6.3 K/UL (ref 1.8–8)
NEUTS SEG NFR BLD: 72 % (ref 32–75)
NRBC # BLD: 0 K/UL (ref 0–0.01)
NRBC BLD-RTO: 0 PER 100 WBC
PCO2 BLDV: 48 MMHG (ref 41–51)
PH BLDV: 7.43 [PH] (ref 7.32–7.42)
PLATELET # BLD AUTO: 323 K/UL (ref 150–400)
PMV BLD AUTO: 9.1 FL (ref 8.9–12.9)
PO2 BLDV: 64 MMHG (ref 25–40)
POTASSIUM SERPL-SCNC: 3 MMOL/L (ref 3.5–5.1)
PROT SERPL-MCNC: 6.9 G/DL (ref 6.4–8.2)
RBC # BLD AUTO: 4.19 M/UL (ref 4.1–5.7)
SAO2 % BLDV: 93 % (ref 65–88)
SAO2% DEVICE SAO2% SENSOR NAME: ABNORMAL
SERVICE CMNT-IMP: ABNORMAL
SODIUM SERPL-SCNC: 140 MMOL/L (ref 136–145)
SPECIMEN SITE: ABNORMAL
TROPONIN-HIGH SENSITIVITY: 20 NG/L (ref 0–76)
TROPONIN-HIGH SENSITIVITY: 24 NG/L (ref 0–76)
WBC # BLD AUTO: 8.8 K/UL (ref 4.1–11.1)

## 2022-08-12 PROCEDURE — 74011250636 HC RX REV CODE- 250/636: Performed by: EMERGENCY MEDICINE

## 2022-08-12 PROCEDURE — 82962 GLUCOSE BLOOD TEST: CPT

## 2022-08-12 PROCEDURE — 36415 COLL VENOUS BLD VENIPUNCTURE: CPT

## 2022-08-12 PROCEDURE — 85025 COMPLETE CBC W/AUTO DIFF WBC: CPT

## 2022-08-12 PROCEDURE — 2709999900 HC NON-CHARGEABLE SUPPLY

## 2022-08-12 PROCEDURE — 99284 EMERGENCY DEPT VISIT MOD MDM: CPT

## 2022-08-12 PROCEDURE — 84484 ASSAY OF TROPONIN QUANT: CPT

## 2022-08-12 PROCEDURE — 96374 THER/PROPH/DIAG INJ IV PUSH: CPT

## 2022-08-12 PROCEDURE — 93005 ELECTROCARDIOGRAM TRACING: CPT

## 2022-08-12 PROCEDURE — 80053 COMPREHEN METABOLIC PANEL: CPT

## 2022-08-12 PROCEDURE — 82803 BLOOD GASES ANY COMBINATION: CPT

## 2022-08-12 RX ORDER — ONDANSETRON 4 MG/1
4 TABLET, FILM COATED ORAL
Qty: 20 TABLET | Refills: 0 | Status: SHIPPED | OUTPATIENT
Start: 2022-08-12

## 2022-08-12 RX ORDER — ONDANSETRON 2 MG/ML
4 INJECTION INTRAMUSCULAR; INTRAVENOUS
Status: COMPLETED | OUTPATIENT
Start: 2022-08-12 | End: 2022-08-12

## 2022-08-12 RX ADMIN — ONDANSETRON 4 MG: 2 INJECTION INTRAMUSCULAR; INTRAVENOUS at 10:21

## 2022-08-12 NOTE — ED PROVIDER NOTES
EMERGENCY DEPARTMENT HISTORY AND PHYSICAL EXAM      Date: 8/12/2022  Patient Name: Lisy García  Patient Age and Sex: 36 y.o. male     History of Presenting Illness     Chief Complaint   Patient presents with    Positive For Covid-19    Vomiting       History Provided By: Patient    HPI: Lisy García is a 17-year-old history ESRD on PD, diabetes, insulin-dependent, gastroparesis presenting with nausea vomiting. Patient reports since yesterday has had persistent nausea and vomiting. He has been unable to tolerate any oral intake. No abdominal pain no chest pain no dyspnea. Vomitus is yellow-colored or food particulate. He took his long-acting insulin last night and his sliding scale insulin this morning, 12 units Humalog. BGL in route 401. Other vital signs normal in route apart from hypertension. Did not take his medicines this morning apart from his insulin. He reports his dialysis alert with his PD has been clear in appearance, no cloudiness. There are no other complaints, changes, or physical findings at this time. PCP: Felisa Hargrove MD    No current facility-administered medications on file prior to encounter. Current Outpatient Medications on File Prior to Encounter   Medication Sig Dispense Refill    sevelamer carbonate (RENVELA) 800 mg tab tab Take 800 mg by mouth two (2) times a day. Lantus Solostar U-100 Insulin 100 unit/mL (3 mL) inpn 16 Units by SubCUTAneous route daily. labetaloL (NORMODYNE) 200 mg tablet Take 200 mg by mouth two (2) times a day. torsemide (DEMADEX) 100 mg tablet Take 100 mg by mouth two (2) times a day.       NovoLOG Flexpen U-100 Insulin 100 unit/mL (3 mL) inpn INJECT 8-12 UNITS subcutaneously WITH MEALS (max DOSE OF 50 units/day)      glucose blood VI test strips (CONTOUR NEXT STRIPS) strip Use to check blood sugar 3 times a day 100 Strip 5    insulin NPH/insulin regular (NOVOLIN 70/30) 100 unit/mL (70-30) injection 40 Units by SubCUTAneous route. 40 U  Morning  30 U at dinner  Indications: TYPE 1 DIABETES MELLITUS      amLODIPine (NORVASC) 5 mg tablet Take 1 Tab by mouth daily. 27 Tab 2       Past History     Past Medical History:  Past Medical History:   Diagnosis Date    Chronic obstructive pulmonary disease ([facility] [address]     Diabetes ([facility] [address]     Hypercholesterolemia     Hypertension     Renal failure        Past Surgical History:  No past surgical history on file. Family History:  Family History   Problem Relation Age of Onset    Hypertension Mother        Social History:  Social History     Tobacco Use    Smoking status: Former     Packs/day: 0.50     Types: Cigarettes    Smokeless tobacco: Never   Vaping Use    Vaping Use: Never used   Substance Use Topics    Alcohol use: Yes     Alcohol/week: 3.0 standard drinks     Types: 3 Cans of beer per week    Drug use: No       Allergies:  No Known Allergies      Review of Systems   Review of Systems   Constitutional:  Negative for chills and fever. HENT:  Negative for congestion and rhinorrhea. Respiratory:  Negative for shortness of breath. Cardiovascular:  Negative for chest pain. Gastrointestinal:  Positive for nausea and vomiting. Negative for abdominal distention, abdominal pain, constipation and diarrhea. Genitourinary:  Negative for dysuria and frequency. Musculoskeletal:  Negative for myalgias. Skin:  Negative for rash. Neurological:  Negative for weakness and numbness. All other systems reviewed and are negative. Physical Exam   Physical Exam  Vitals and nursing note reviewed. Constitutional:       Appearance: Normal appearance. He is not ill-appearing or toxic-appearing. HENT:      Mouth/Throat:      Mouth: Mucous membranes are moist.   Eyes:      Conjunctiva/sclera: Conjunctivae normal.   Cardiovascular:      Rate and Rhythm: Normal rate and regular rhythm. Pulses: Normal pulses. Heart sounds: Normal heart sounds.    Pulmonary:      Effort: Pulmonary effort is normal.   Abdominal:      General: Abdomen is flat. Comments: Dialysis catheter in place. Abdomen is soft, nontender, nondistended no fluid wave. Musculoskeletal:         General: No deformity. Skin:     General: Skin is warm and dry. Neurological:      Mental Status: He is alert and oriented to person, place, and time. Mental status is at baseline. Psychiatric:         Behavior: Behavior normal.         Thought Content: Thought content normal.        Diagnostic Study Results     Labs -     Recent Results (from the past 12 hour(s))   GLUCOSE, POC    Collection Time: 08/12/22  9:02 AM   Result Value Ref Range    Glucose (POC) 376 (H) 65 - 117 mg/dL    Performed by Oliverio SPRINGER RN    CBC WITH AUTOMATED DIFF    Collection Time: 08/12/22  9:10 AM   Result Value Ref Range    WBC 8.8 4.1 - 11.1 K/uL    RBC 4.19 4. 10 - 5.70 M/uL    HGB 12.5 12.1 - 17.0 g/dL    HCT 36.5 (L) 36.6 - 50.3 %    MCV 87.1 80.0 - 99.0 FL    MCH 29.8 26.0 - 34.0 PG    MCHC 34.2 30.0 - 36.5 g/dL    RDW 13.1 11.5 - 14.5 %    PLATELET 065 720 - 302 K/uL    MPV 9.1 8.9 - 12.9 FL    NRBC 0.0 0  WBC    ABSOLUTE NRBC 0.00 0.00 - 0.01 K/uL    NEUTROPHILS 72 32 - 75 %    LYMPHOCYTES 18 12 - 49 %    MONOCYTES 4 (L) 5 - 13 %    EOSINOPHILS 5 0 - 7 %    BASOPHILS 1 0 - 1 %    IMMATURE GRANULOCYTES 0 0.0 - 0.5 %    ABS. NEUTROPHILS 6.3 1.8 - 8.0 K/UL    ABS. LYMPHOCYTES 1.6 0.8 - 3.5 K/UL    ABS. MONOCYTES 0.4 0.0 - 1.0 K/UL    ABS. EOSINOPHILS 0.4 0.0 - 0.4 K/UL    ABS. BASOPHILS 0.1 0.0 - 0.1 K/UL    ABS. IMM.  GRANS. 0.0 0.00 - 0.04 K/UL    DF AUTOMATED     METABOLIC PANEL, COMPREHENSIVE    Collection Time: 08/12/22  9:10 AM   Result Value Ref Range    Sodium 140 136 - 145 mmol/L    Potassium 3.0 (L) 3.5 - 5.1 mmol/L    Chloride 99 97 - 108 mmol/L    CO2 30 21 - 32 mmol/L    Anion gap 11 5 - 15 mmol/L    Glucose 377 (H) 65 - 100 mg/dL    BUN 45 (H) 6 - 20 MG/DL    Creatinine 16.21 (H) 0.70 - 1.30 MG/DL    BUN/Creatinine ratio 3 (L) 12 - 20      GFR est AA 4 (L) >60 ml/min/1.73m2    GFR est non-AA 4 (L) >60 ml/min/1.73m2    Calcium 9.2 8.5 - 10.1 MG/DL    Bilirubin, total 0.3 0.2 - 1.0 MG/DL    ALT (SGPT) 25 12 - 78 U/L    AST (SGOT) 15 15 - 37 U/L    Alk. phosphatase 88 45 - 117 U/L    Protein, total 6.9 6.4 - 8.2 g/dL    Albumin 3.2 (L) 3.5 - 5.0 g/dL    Globulin 3.7 2.0 - 4.0 g/dL    A-G Ratio 0.9 (L) 1.1 - 2.2     TROPONIN-HIGH SENSITIVITY    Collection Time: 08/12/22  9:10 AM   Result Value Ref Range    Troponin-High Sensitivity 24 0 - 76 ng/L   EKG, 12 LEAD, INITIAL    Collection Time: 08/12/22  9:14 AM   Result Value Ref Range    Ventricular Rate 88 BPM    Atrial Rate 88 BPM    P-R Interval 154 ms    QRS Duration 100 ms    Q-T Interval 392 ms    QTC Calculation (Bezet) 474 ms    Calculated P Axis 59 degrees    Calculated R Axis 78 degrees    Calculated T Axis 37 degrees    Diagnosis       Normal sinus rhythm  Possible Left atrial enlargement  Cannot rule out Anterior infarct , age undetermined  Abnormal ECG  When compared with ECG of 03-OCT-2021 19:12,  Vent. rate has increased BY  30 BPM  T wave inversion now evident in Lateral leads     BLOOD GAS, VENOUS    Collection Time: 08/12/22  9:49 AM   Result Value Ref Range    VENOUS PH 7.43 (H) 7.32 - 7.42      VENOUS PCO2 48.0 41 - 51 mmHg    VENOUS PO2 64 (H) 25 - 40 mmHg    VENOUS BICARBONATE 31 (H) 23 - 28 mmol/L    VENOUS BASE EXCESS 5.5 mmol/L    VENOUS O2 SATURATION 93 (H) 65 - 88 %    O2 METHOD ROOM AIR      Sample source VENOUS BLOOD      SITE OTHER     TROPONIN-HIGH SENSITIVITY    Collection Time: 08/12/22 11:25 AM   Result Value Ref Range    Troponin-High Sensitivity 20 0 - 76 ng/L       Radiologic Studies -   No orders to display     CT Results  (Last 48 hours)      None          CXR Results  (Last 48 hours)      None              Medical Decision Making   I am the first provider for this patient.     I reviewed the vital signs, available nursing notes, past medical history, past surgical history, family history and social history. Vital Signs-Reviewed the patient's vital signs. Patient Vitals for the past 12 hrs:   Temp Pulse Resp BP SpO2   08/12/22 0856 98.4 °F (36.9 °C) 94 20 (!) 178/118 100 %       Records Reviewed: Nursing Notes and Old Medical Records    Provider Notes (Medical Decision Making):   DDx gastroparesis, ACS, electrolyte derangement    Consider intra-abdominal infection however he is afebrile with no abdominal tenderness no abdominal pain. Will screen with CBC, CMP. 5 for ischemic EKG troponin. Hyperglycemic in route, will obtain VBG to evaluate for acidosis. Patient is anuric, cannot check for ketonuria. ED Course:   Initial assessment performed. The patients presenting problems have been discussed, and they are in agreement with the care plan formulated and outlined with them. I have encouraged them to ask questions as they arise throughout their visit. ED Course as of 08/12/22 1306   Fri Aug 12, 2022   0907 POC glucose 376 [WB]   0919 White count 8.8 hemoglobin is normal at 12.5, normal differential [WB]   0923 Consider peritonitis however is without abdominal pain, no change to desolate, afebrile with normal white count; low suspicion given above [WB]   0924 EKG shows normal sinus rhythm at a rate of 88 normal axis, normal intervals.   T wave inversions isolated in V6, no STEMI. [WB]   0926 Is isolated to perfusion is new compared to EKG performed October 2021, deep Q/R waves in anterior septal leads are unchanged [WB]   1016 VBG with mild alkalosis normal PCO2, mildly elevated venous bicarb [WB]   1017 CMP with stable creatinine of 16 BUN mildly elevated at 45 which is relatively stable potassium actually mildly low at 3.0 [WB]   1017 Troponin 24, will trend [WB]   1215 Patient requesting drink at this time, tolerating oral intake without difficulty [WB]   1303 Repeat troponin 20 [WB]      ED Course User Index  [WB] Charlie Preston MD Disposition:  Discharge Note:  The patient has been re-evaluated and is ready for discharge. Reviewed available results with patient. Counseled patient on diagnosis and care plan. Patient has expressed understanding, and all questions have been answered. Patient agrees with plan and agrees to follow up as recommended, or to return to the ED if their symptoms worsen. Discharge instructions have been provided and explained to the patient, along with reasons to return to the ED. PLAN:  Current Discharge Medication List        START taking these medications    Details   ondansetron hcl (Zofran) 4 mg tablet Take 1 Tablet by mouth every eight (8) hours as needed for Nausea. Qty: 20 Tablet, Refills: 0  Start date: 8/12/2022           2. Follow-up Information       Follow up With Specialties Details Why [address] EMERGENCY DEPT Emergency Medicine  As needed, If symptoms worsen Dank Ray          3. Return to ED if worse     Diagnosis     Clinical Impression:   1. Hypokalemia    2. Nausea and vomiting, unspecified vomiting type    3. Hyperglycemia        Attestations:    Benjamin Pierre M.D. Please note that this dictation was completed with [employer], the computer voice recognition software. Quite often unanticipated grammatical, syntax, homophones, and other interpretive errors are inadvertently transcribed by the computer software. Please disregard these errors. Please excuse any errors that have escaped final proofreading. Thank you.

## 2022-08-12 NOTE — ED TRIAGE NOTES
Pt to Er via ems for c/o nausea and vomiting for the last few days. Per pt he tested positive for covid on the 31st of July. Denies any fevers or sob. Pt has hx of diabetes, kidney failure on dialysis, and hypertension.

## 2022-08-12 NOTE — ED NOTES
Discharge instructions were given to the patient by Felisha Hua RN. The patient left the Emergency Department ambulatory, alert and oriented and in no acute distress with 1 prescriptions. The patient was encouraged to call or return to the ED for worsening issues or problems and was encouraged to schedule a follow up appointment for continuing care. The patient verbalized understanding of discharge instructions and prescriptions, all questions were answered. The patient has no further concerns at this time.

## 2022-08-15 LAB
ATRIAL RATE: 88 BPM
CALCULATED P AXIS, ECG09: 59 DEGREES
CALCULATED R AXIS, ECG10: 78 DEGREES
CALCULATED T AXIS, ECG11: 37 DEGREES
DIAGNOSIS, 93000: NORMAL
P-R INTERVAL, ECG05: 154 MS
Q-T INTERVAL, ECG07: 392 MS
QRS DURATION, ECG06: 100 MS
QTC CALCULATION (BEZET), ECG08: 474 MS
VENTRICULAR RATE, ECG03: 88 BPM

## 2023-05-19 RX ORDER — INSULIN ASPART 100 [IU]/ML
INJECTION, SOLUTION INTRAVENOUS; SUBCUTANEOUS
COMMUNITY
Start: 2020-12-14

## 2023-05-19 RX ORDER — INSULIN GLARGINE 100 [IU]/ML
16 INJECTION, SOLUTION SUBCUTANEOUS DAILY
COMMUNITY
Start: 2021-03-08

## 2023-05-19 RX ORDER — ONDANSETRON 4 MG/1
4 TABLET, FILM COATED ORAL EVERY 8 HOURS PRN
COMMUNITY
Start: 2022-08-12

## 2023-05-19 RX ORDER — LABETALOL 200 MG/1
200 TABLET, FILM COATED ORAL 2 TIMES DAILY
COMMUNITY
Start: 2021-03-03

## 2023-05-19 RX ORDER — SEVELAMER CARBONATE 800 MG/1
800 TABLET, FILM COATED ORAL 2 TIMES DAILY
COMMUNITY
Start: 2021-03-03

## 2023-05-19 RX ORDER — TORSEMIDE 100 MG/1
100 TABLET ORAL 2 TIMES DAILY
COMMUNITY
Start: 2021-03-03

## 2023-05-19 RX ORDER — AMLODIPINE BESYLATE 5 MG/1
5 TABLET ORAL DAILY
COMMUNITY
Start: 2016-10-17

## 2024-05-19 ENCOUNTER — HOSPITAL ENCOUNTER (OUTPATIENT)
Facility: HOSPITAL | Age: 34
Setting detail: SPECIMEN
Discharge: HOME | End: 2024-05-22

## 2024-05-19 LAB
ANION GAP SERPL CALC-SCNC: 6 MMOL/L (ref 5–15)
ANION GAP SERPL CALC-SCNC: 8 MMOL/L (ref 5–15)
ANION GAP SERPL CALC-SCNC: 9 MMOL/L (ref 5–15)
BASOPHILS # BLD: 0.1 K/UL (ref 0–0.1)
BASOPHILS NFR BLD: 1 % (ref 0–1)
BUN SERPL-MCNC: 51 MG/DL (ref 6–20)
BUN SERPL-MCNC: 51 MG/DL (ref 6–20)
BUN SERPL-MCNC: 52 MG/DL (ref 6–20)
BUN/CREAT SERPL: 8 (ref 12–20)
CA-I BLD-MCNC: 8.9 MG/DL (ref 8.5–10.1)
CA-I BLD-MCNC: 8.9 MG/DL (ref 8.5–10.1)
CA-I BLD-MCNC: 9 MG/DL (ref 8.5–10.1)
CHLORIDE SERPL-SCNC: 101 MMOL/L (ref 97–108)
CHLORIDE SERPL-SCNC: 101 MMOL/L (ref 97–108)
CHLORIDE SERPL-SCNC: 103 MMOL/L (ref 97–108)
CO2 SERPL-SCNC: 23 MMOL/L (ref 21–32)
CO2 SERPL-SCNC: 26 MMOL/L (ref 21–32)
CO2 SERPL-SCNC: 27 MMOL/L (ref 21–32)
CREAT SERPL-MCNC: 6.08 MG/DL (ref 0.7–1.3)
CREAT SERPL-MCNC: 6.42 MG/DL (ref 0.7–1.3)
CREAT SERPL-MCNC: 6.44 MG/DL (ref 0.7–1.3)
DIFFERENTIAL METHOD BLD: ABNORMAL
EOSINOPHIL # BLD: 0.5 K/UL (ref 0–0.4)
EOSINOPHIL NFR BLD: 4 % (ref 0–7)
ERYTHROCYTE [DISTWIDTH] IN BLOOD BY AUTOMATED COUNT: 14.3 % (ref 11.5–14.5)
GLUCOSE SERPL-MCNC: 109 MG/DL (ref 65–100)
GLUCOSE SERPL-MCNC: 116 MG/DL (ref 65–100)
GLUCOSE SERPL-MCNC: 186 MG/DL (ref 65–100)
HCT VFR BLD AUTO: 26.1 % (ref 36.6–50.3)
HGB BLD-MCNC: 8.5 G/DL (ref 12.1–17)
IMM GRANULOCYTES # BLD AUTO: 0.1 K/UL (ref 0–0.04)
IMM GRANULOCYTES NFR BLD AUTO: 1 % (ref 0–0.5)
LYMPHOCYTES # BLD: 1.5 K/UL (ref 0.8–3.5)
LYMPHOCYTES NFR BLD: 12 % (ref 12–49)
MCH RBC QN AUTO: 28.9 PG (ref 26–34)
MCHC RBC AUTO-ENTMCNC: 32.6 G/DL (ref 30–36.5)
MCV RBC AUTO: 88.8 FL (ref 80–99)
MONOCYTES # BLD: 1.1 K/UL (ref 0–1)
MONOCYTES NFR BLD: 9 % (ref 5–13)
NEUTS SEG # BLD: 9.7 K/UL (ref 1.8–8)
NEUTS SEG NFR BLD: 73 % (ref 32–75)
NRBC # BLD: 0 K/UL (ref 0–0.01)
NRBC BLD-RTO: 0 PER 100 WBC
PLATELET # BLD AUTO: 487 K/UL (ref 150–400)
PMV BLD AUTO: 9.1 FL (ref 8.9–12.9)
POTASSIUM SERPL-SCNC: 5.1 MMOL/L (ref 3.5–5.1)
POTASSIUM SERPL-SCNC: 5.2 MMOL/L (ref 3.5–5.1)
POTASSIUM SERPL-SCNC: 6 MMOL/L (ref 3.5–5.1)
RBC # BLD AUTO: 2.94 M/UL (ref 4.1–5.7)
SODIUM SERPL-SCNC: 133 MMOL/L (ref 136–145)
SODIUM SERPL-SCNC: 135 MMOL/L (ref 136–145)
SODIUM SERPL-SCNC: 136 MMOL/L (ref 136–145)
WBC # BLD AUTO: 13 K/UL (ref 4.1–11.1)

## 2024-05-19 PROCEDURE — 85025 COMPLETE CBC W/AUTO DIFF WBC: CPT

## 2024-05-19 PROCEDURE — 80048 BASIC METABOLIC PNL TOTAL CA: CPT

## 2024-05-21 ENCOUNTER — HOSPITAL ENCOUNTER (OUTPATIENT)
Facility: HOSPITAL | Age: 34
Setting detail: SPECIMEN
Discharge: HOME | End: 2024-05-24

## 2024-05-21 LAB
ANION GAP SERPL CALC-SCNC: 10 MMOL/L (ref 5–15)
BASOPHILS # BLD: 0.1 K/UL (ref 0–0.1)
BASOPHILS NFR BLD: 1 % (ref 0–1)
BUN SERPL-MCNC: 47 MG/DL (ref 6–20)
BUN/CREAT SERPL: 9 (ref 12–20)
CA-I BLD-MCNC: 8.6 MG/DL (ref 8.5–10.1)
CHLORIDE SERPL-SCNC: 99 MMOL/L (ref 97–108)
CO2 SERPL-SCNC: 25 MMOL/L (ref 21–32)
CREAT SERPL-MCNC: 5.41 MG/DL (ref 0.7–1.3)
DIFFERENTIAL METHOD BLD: ABNORMAL
EOSINOPHIL # BLD: 0.5 K/UL (ref 0–0.4)
EOSINOPHIL NFR BLD: 3 % (ref 0–7)
ERYTHROCYTE [DISTWIDTH] IN BLOOD BY AUTOMATED COUNT: 14.4 % (ref 11.5–14.5)
GLUCOSE SERPL-MCNC: 137 MG/DL (ref 65–100)
HCT VFR BLD AUTO: 26.5 % (ref 36.6–50.3)
HGB BLD-MCNC: 8.4 G/DL (ref 12.1–17)
IMM GRANULOCYTES # BLD AUTO: 0.1 K/UL (ref 0–0.04)
IMM GRANULOCYTES NFR BLD AUTO: 0 % (ref 0–0.5)
LYMPHOCYTES # BLD: 1.9 K/UL (ref 0.8–3.5)
LYMPHOCYTES NFR BLD: 14 % (ref 12–49)
MCH RBC QN AUTO: 28.5 PG (ref 26–34)
MCHC RBC AUTO-ENTMCNC: 31.7 G/DL (ref 30–36.5)
MCV RBC AUTO: 89.8 FL (ref 80–99)
MONOCYTES # BLD: 1.1 K/UL (ref 0–1)
MONOCYTES NFR BLD: 8 % (ref 5–13)
NEUTS SEG # BLD: 10.1 K/UL (ref 1.8–8)
NEUTS SEG NFR BLD: 74 % (ref 32–75)
NRBC # BLD: 0 K/UL (ref 0–0.01)
NRBC BLD-RTO: 0 PER 100 WBC
PLATELET # BLD AUTO: 514 K/UL (ref 150–400)
PMV BLD AUTO: 10 FL (ref 8.9–12.9)
POTASSIUM SERPL-SCNC: 5.2 MMOL/L (ref 3.5–5.1)
RBC # BLD AUTO: 2.95 M/UL (ref 4.1–5.7)
SODIUM SERPL-SCNC: 134 MMOL/L (ref 136–145)
WBC # BLD AUTO: 13.8 K/UL (ref 4.1–11.1)

## 2024-05-21 PROCEDURE — 80048 BASIC METABOLIC PNL TOTAL CA: CPT

## 2024-05-21 PROCEDURE — 85025 COMPLETE CBC W/AUTO DIFF WBC: CPT

## 2024-05-24 ENCOUNTER — HOSPITAL ENCOUNTER (OUTPATIENT)
Facility: HOSPITAL | Age: 34
Setting detail: SPECIMEN
Discharge: HOME | End: 2024-05-27

## 2024-05-24 ENCOUNTER — HOSPITAL ENCOUNTER (OUTPATIENT)
Facility: HOSPITAL | Age: 34
Discharge: HOME | End: 2024-05-27

## 2024-05-24 DIAGNOSIS — R10.84 ABDOMINAL PAIN, GENERALIZED: ICD-10-CM

## 2024-05-24 LAB
ANION GAP SERPL CALC-SCNC: 9 MMOL/L (ref 5–15)
BASOPHILS # BLD: 0.1 K/UL (ref 0–0.1)
BASOPHILS NFR BLD: 1 % (ref 0–1)
BUN SERPL-MCNC: 68 MG/DL (ref 6–20)
BUN/CREAT SERPL: 10 (ref 12–20)
CA-I BLD-MCNC: 8.5 MG/DL (ref 8.5–10.1)
CHLORIDE SERPL-SCNC: 98 MMOL/L (ref 97–108)
CO2 SERPL-SCNC: 26 MMOL/L (ref 21–32)
CREAT SERPL-MCNC: 7 MG/DL (ref 0.7–1.3)
DIFFERENTIAL METHOD BLD: ABNORMAL
EOSINOPHIL # BLD: 0.5 K/UL (ref 0–0.4)
EOSINOPHIL NFR BLD: 5 % (ref 0–7)
ERYTHROCYTE [DISTWIDTH] IN BLOOD BY AUTOMATED COUNT: 14.2 % (ref 11.5–14.5)
GLUCOSE SERPL-MCNC: 272 MG/DL (ref 65–100)
HCT VFR BLD AUTO: 25.8 % (ref 36.6–50.3)
HGB BLD-MCNC: 8.1 G/DL (ref 12.1–17)
IMM GRANULOCYTES # BLD AUTO: 0 K/UL (ref 0–0.04)
IMM GRANULOCYTES NFR BLD AUTO: 0 % (ref 0–0.5)
LYMPHOCYTES # BLD: 1.5 K/UL (ref 0.8–3.5)
LYMPHOCYTES NFR BLD: 13 % (ref 12–49)
MCH RBC QN AUTO: 27.9 PG (ref 26–34)
MCHC RBC AUTO-ENTMCNC: 31.4 G/DL (ref 30–36.5)
MCV RBC AUTO: 89 FL (ref 80–99)
MONOCYTES # BLD: 0.7 K/UL (ref 0–1)
MONOCYTES NFR BLD: 6 % (ref 5–13)
NEUTS SEG # BLD: 8.5 K/UL (ref 1.8–8)
NEUTS SEG NFR BLD: 75 % (ref 32–75)
NRBC # BLD: 0 K/UL (ref 0–0.01)
NRBC BLD-RTO: 0 PER 100 WBC
PLATELET # BLD AUTO: 537 K/UL (ref 150–400)
PMV BLD AUTO: 9.6 FL (ref 8.9–12.9)
POTASSIUM SERPL-SCNC: 5.2 MMOL/L (ref 3.5–5.1)
RBC # BLD AUTO: 2.9 M/UL (ref 4.1–5.7)
SODIUM SERPL-SCNC: 133 MMOL/L (ref 136–145)
WBC # BLD AUTO: 11.3 K/UL (ref 4.1–11.1)

## 2024-05-24 PROCEDURE — 85025 COMPLETE CBC W/AUTO DIFF WBC: CPT

## 2024-05-24 PROCEDURE — 74178 CT ABD&PLV WO CNTR FLWD CNTR: CPT

## 2024-05-24 PROCEDURE — 6360000004 HC RX CONTRAST MEDICATION

## 2024-05-24 PROCEDURE — 80048 BASIC METABOLIC PNL TOTAL CA: CPT

## 2024-05-24 RX ADMIN — IOPAMIDOL 100 ML: 755 INJECTION, SOLUTION INTRAVENOUS at 19:16

## 2024-05-27 ENCOUNTER — HOSPITAL ENCOUNTER (OUTPATIENT)
Facility: HOSPITAL | Age: 34
Setting detail: SPECIMEN
Discharge: HOME | End: 2024-05-30

## 2024-05-27 LAB
ANION GAP SERPL CALC-SCNC: 12 MMOL/L (ref 5–15)
BASOPHILS # BLD: 0.1 K/UL (ref 0–0.1)
BASOPHILS NFR BLD: 1 % (ref 0–1)
BUN SERPL-MCNC: 73 MG/DL (ref 6–20)
BUN/CREAT SERPL: 10 (ref 12–20)
CA-I BLD-MCNC: 8.5 MG/DL (ref 8.5–10.1)
CHLORIDE SERPL-SCNC: 98 MMOL/L (ref 97–108)
CO2 SERPL-SCNC: 25 MMOL/L (ref 21–32)
CREAT SERPL-MCNC: 7.4 MG/DL (ref 0.7–1.3)
DIFFERENTIAL METHOD BLD: ABNORMAL
EOSINOPHIL # BLD: 0.7 K/UL (ref 0–0.4)
EOSINOPHIL NFR BLD: 5 % (ref 0–7)
ERYTHROCYTE [DISTWIDTH] IN BLOOD BY AUTOMATED COUNT: 14.8 % (ref 11.5–14.5)
GLUCOSE SERPL-MCNC: 110 MG/DL (ref 65–100)
HCT VFR BLD AUTO: 25.8 % (ref 36.6–50.3)
HGB BLD-MCNC: 8.3 G/DL (ref 12.1–17)
IMM GRANULOCYTES # BLD AUTO: 0.1 K/UL (ref 0–0.04)
IMM GRANULOCYTES NFR BLD AUTO: 0 % (ref 0–0.5)
LYMPHOCYTES # BLD: 2 K/UL (ref 0.8–3.5)
LYMPHOCYTES NFR BLD: 15 % (ref 12–49)
MCH RBC QN AUTO: 28.6 PG (ref 26–34)
MCHC RBC AUTO-ENTMCNC: 32.2 G/DL (ref 30–36.5)
MCV RBC AUTO: 89 FL (ref 80–99)
MONOCYTES # BLD: 1.1 K/UL (ref 0–1)
MONOCYTES NFR BLD: 8 % (ref 5–13)
NEUTS SEG # BLD: 9.4 K/UL (ref 1.8–8)
NEUTS SEG NFR BLD: 71 % (ref 32–75)
NRBC # BLD: 0 K/UL (ref 0–0.01)
NRBC BLD-RTO: 0 PER 100 WBC
PLATELET # BLD AUTO: 591 K/UL (ref 150–400)
PMV BLD AUTO: 9.5 FL (ref 8.9–12.9)
POTASSIUM SERPL-SCNC: 5.1 MMOL/L (ref 3.5–5.1)
RBC # BLD AUTO: 2.9 M/UL (ref 4.1–5.7)
SODIUM SERPL-SCNC: 135 MMOL/L (ref 136–145)
WBC # BLD AUTO: 13.3 K/UL (ref 4.1–11.1)

## 2024-05-27 PROCEDURE — 85025 COMPLETE CBC W/AUTO DIFF WBC: CPT

## 2024-05-27 PROCEDURE — 80048 BASIC METABOLIC PNL TOTAL CA: CPT

## 2025-03-15 ENCOUNTER — HOSPITAL ENCOUNTER (EMERGENCY)
Facility: HOSPITAL | Age: 35
Discharge: HOME | End: 2025-03-15
Attending: STUDENT IN AN ORGANIZED HEALTH CARE EDUCATION/TRAINING PROGRAM
Payer: MEDICARE

## 2025-03-15 VITALS
OXYGEN SATURATION: 99 % | RESPIRATION RATE: 16 BRPM | TEMPERATURE: 97.8 F | SYSTOLIC BLOOD PRESSURE: 172 MMHG | WEIGHT: 149.25 LBS | HEART RATE: 79 BPM | DIASTOLIC BLOOD PRESSURE: 117 MMHG | HEIGHT: 70 IN | BODY MASS INDEX: 21.37 KG/M2

## 2025-03-15 DIAGNOSIS — E10.65 TYPE 1 DIABETES MELLITUS WITH HYPERGLYCEMIA (HCC): ICD-10-CM

## 2025-03-15 DIAGNOSIS — T82.838A HEMORRHAGE OF ARTERIOVENOUS FISTULA, INITIAL ENCOUNTER: Primary | ICD-10-CM

## 2025-03-15 LAB
GLUCOSE BLD STRIP.AUTO-MCNC: 237 MG/DL (ref 65–117)
SERVICE CMNT-IMP: ABNORMAL

## 2025-03-15 PROCEDURE — 99284 EMERGENCY DEPT VISIT MOD MDM: CPT

## 2025-03-15 PROCEDURE — 2500000003 HC RX 250 WO HCPCS: Performed by: STUDENT IN AN ORGANIZED HEALTH CARE EDUCATION/TRAINING PROGRAM

## 2025-03-15 PROCEDURE — 6370000000 HC RX 637 (ALT 250 FOR IP): Performed by: STUDENT IN AN ORGANIZED HEALTH CARE EDUCATION/TRAINING PROGRAM

## 2025-03-15 PROCEDURE — 82962 GLUCOSE BLOOD TEST: CPT

## 2025-03-15 RX ORDER — INSULIN LISPRO 100 [IU]/ML
10 INJECTION, SOLUTION INTRAVENOUS; SUBCUTANEOUS ONCE
Status: COMPLETED | OUTPATIENT
Start: 2025-03-15 | End: 2025-03-15

## 2025-03-15 RX ORDER — TRANEXAMIC ACID 100 MG/ML
15 INJECTION, SOLUTION INTRAVENOUS
Status: COMPLETED | OUTPATIENT
Start: 2025-03-15 | End: 2025-03-15

## 2025-03-15 RX ADMIN — INSULIN LISPRO 10 UNITS: 100 INJECTION, SOLUTION INTRAVENOUS; SUBCUTANEOUS at 16:53

## 2025-03-15 RX ADMIN — TRANEXAMIC ACID 1000 MG: 100 INJECTION, SOLUTION INTRAVENOUS at 16:24

## 2025-03-15 ASSESSMENT — LIFESTYLE VARIABLES
HOW MANY STANDARD DRINKS CONTAINING ALCOHOL DO YOU HAVE ON A TYPICAL DAY: PATIENT DOES NOT DRINK
HOW OFTEN DO YOU HAVE A DRINK CONTAINING ALCOHOL: NEVER

## 2025-03-15 NOTE — DISCHARGE INSTRUCTIONS
Please make a followup with your primary care physician and other providers.  If you have any new or worsening concerning medical symptoms please return to the emergency department.

## 2025-03-15 NOTE — ED TRIAGE NOTES
Pt BIBA for c/o bleeding after dialysis treatment for 1 hour after dialysis. - Thinners. On arrival pt is Aox4. Denies dizzyiness or SOB. Bleeding controled by clamp. Pulse equal bilaterally. Respirations regular, even ,and unlabored on RA.

## 2025-03-15 NOTE — ED PROVIDER NOTES
Hypertension Mother        Social History:  Social History     Tobacco Use    Smoking status: Former     Current packs/day: 0.50     Types: Cigarettes    Smokeless tobacco: Never   Substance Use Topics    Alcohol use: Yes     Alcohol/week: 3.0 standard drinks of alcohol    Drug use: No       Allergies:  Allergies   Allergen Reactions    Cefepime-Dextrose        CURRENT MEDICATIONS      Previous Medications    AMLODIPINE (NORVASC) 5 MG TABLET    Take 1 tablet by mouth daily    INSULIN 70-30 (HUMULIN;NOVOLIN) (70-30) 100 UNIT PER ML INJECTION VIAL    Inject 40 Units into the skin    INSULIN ASPART (NOVOLOG FLEXPEN) 100 UNIT/ML INJECTION PEN    INJECT 8-12 UNITS subcutaneously WITH MEALS (max DOSE OF 50 units/day)    INSULIN GLARGINE (LANTUS SOLOSTAR) 100 UNIT/ML INJECTION PEN    Inject 16 Units into the skin daily    LABETALOL (NORMODYNE) 200 MG TABLET    Take 1 tablet by mouth 2 times daily    ONDANSETRON (ZOFRAN) 4 MG TABLET    Take 1 tablet by mouth every 8 hours as needed    SEVELAMER (RENVELA) 800 MG TABLET    Take 1 tablet by mouth 2 times daily    TORSEMIDE (DEMADEX) 100 MG TABLET    Take 1 tablet by mouth 2 times daily       SCREENINGS               No data recorded            PHYSICAL EXAM      ED Triage Vitals [03/15/25 3077]   Encounter Vitals Group      BP (!) 172/117      Systolic BP Percentile       Diastolic BP Percentile       Pulse 79      Respirations 16      Temp 97.8 °F (36.6 °C)      Temp Source Oral      SpO2 98 %      Weight - Scale 67.7 kg (149 lb 4 oz)      Height 1.778 m (5' 10\")      Head Circumference       Peak Flow       Pain Score       Pain Loc       Pain Education       Exclude from Growth Chart         Physical Exam  Vital signs reviewed and documented in EMR  Nontoxic and well-appearing  No acute distress  Appears older than stated age  No tachycardia  Abdomen nondistended  No focal neuro deficits  Pulsatile bleeding from AV graft access site  Palpable radial pulse, FROM of digits,  The patient is to follow up as recommended or return to ER should their symptoms worsen.      PATIENT REFERRED TO:  Eder Lee MD  [address]  [address]  [facility] [address]  [phone]    Schedule an appointment as soon as possible for a visit       [facility] Emergency Department  [address]  [facility] [address]  [phone]    As needed, If symptoms worsen    Your vascular access center and other outpatient providers    Schedule an appointment as soon as possible for a visit          DISCHARGE MEDICATIONS:     Medication List        ASK your doctor about these medications      amLODIPine 5 MG tablet  Commonly known as: NORVASC     insulin 70-30 (70-30) 100 UNIT per ML injection vial  Commonly known as: HUMULIN;NOVOLIN     labetalol 200 MG tablet  Commonly known as: NORMODYNE     Lantus SoloStar 100 UNIT/ML injection pen  Generic drug: insulin glargine     NovoLOG FlexPen 100 UNIT/ML injection pen  Generic drug: insulin aspart     ondansetron 4 MG tablet  Commonly known as: ZOFRAN     sevelamer 800 MG tablet  Commonly known as: RENVELA     torsemide 100 MG tablet  Commonly known as: DEMADEX                DISCONTINUED MEDICATIONS:  Current Discharge Medication List          I am the Primary Clinician of Record.   Elio Naik MD (electronically signed)    (Please note that parts of this dictation were completed with voice recognition software. Quite often unanticipated grammatical, syntax, homophones, and other interpretive errors are inadvertently transcribed by the computer software. Please disregards these errors. Please excuse any errors that have escaped final proofreading.)          Elio Naik MD  03/15/25 5796

## 2025-06-08 ENCOUNTER — HOSPITAL ENCOUNTER (EMERGENCY)
Facility: HOSPITAL | Age: 35
Discharge: HOME | End: 2025-06-08

## 2025-06-08 VITALS
BODY MASS INDEX: 21.55 KG/M2 | WEIGHT: 145.5 LBS | RESPIRATION RATE: 20 BRPM | OXYGEN SATURATION: 96 % | SYSTOLIC BLOOD PRESSURE: 122 MMHG | HEART RATE: 72 BPM | HEIGHT: 69 IN | DIASTOLIC BLOOD PRESSURE: 82 MMHG | TEMPERATURE: 97.9 F

## 2025-06-08 ASSESSMENT — PAIN - FUNCTIONAL ASSESSMENT: PAIN_FUNCTIONAL_ASSESSMENT: 0-10

## 2025-06-08 ASSESSMENT — PAIN DESCRIPTION - LOCATION: LOCATION: ABDOMEN

## 2025-06-08 ASSESSMENT — PAIN SCALES - GENERAL: PAINLEVEL_OUTOF10: 10

## 2025-06-09 ENCOUNTER — HOSPITAL ENCOUNTER (EMERGENCY)
Facility: HOSPITAL | Age: 35
Discharge: HOME | End: 2025-06-09
Attending: EMERGENCY MEDICINE
Payer: MEDICARE

## 2025-06-09 ENCOUNTER — APPOINTMENT (OUTPATIENT)
Facility: HOSPITAL | Age: 35
End: 2025-06-09
Payer: MEDICARE

## 2025-06-09 VITALS
WEIGHT: 145.5 LBS | SYSTOLIC BLOOD PRESSURE: 180 MMHG | HEART RATE: 79 BPM | HEIGHT: 69 IN | DIASTOLIC BLOOD PRESSURE: 94 MMHG | RESPIRATION RATE: 15 BRPM | OXYGEN SATURATION: 99 % | BODY MASS INDEX: 21.55 KG/M2 | TEMPERATURE: 98.1 F

## 2025-06-09 DIAGNOSIS — R10.84 GENERALIZED ABDOMINAL PAIN: ICD-10-CM

## 2025-06-09 DIAGNOSIS — R11.2 NAUSEA AND VOMITING, UNSPECIFIED VOMITING TYPE: Primary | ICD-10-CM

## 2025-06-09 DIAGNOSIS — K59.00 COLONIC CONSTIPATION: ICD-10-CM

## 2025-06-09 LAB
ALBUMIN SERPL-MCNC: 4.2 G/DL (ref 3.5–5)
ALBUMIN/GLOB SERPL: 1.2 (ref 1.1–2.2)
ALP SERPL-CCNC: 194 U/L (ref 45–117)
ALT SERPL-CCNC: 24 U/L (ref 12–78)
ANION GAP SERPL CALC-SCNC: 18 MMOL/L (ref 2–12)
AST SERPL-CCNC: 15 U/L (ref 15–37)
BASE DEFICIT BLDV-SCNC: 3 MMOL/L
BASOPHILS # BLD: 0.09 K/UL (ref 0–0.1)
BASOPHILS NFR BLD: 1.6 % (ref 0–1)
BILIRUB SERPL-MCNC: 0.5 MG/DL (ref 0.2–1)
BUN SERPL-MCNC: 59 MG/DL (ref 6–20)
BUN/CREAT SERPL: 5 (ref 12–20)
CALCIUM SERPL-MCNC: 8.9 MG/DL (ref 8.5–10.1)
CHLORIDE SERPL-SCNC: 96 MMOL/L (ref 97–108)
CO2 SERPL-SCNC: 22 MMOL/L (ref 21–32)
COMMENT:: NORMAL
CREAT SERPL-MCNC: 12.2 MG/DL (ref 0.7–1.3)
DIFFERENTIAL METHOD BLD: ABNORMAL
EOSINOPHIL # BLD: 0.29 K/UL (ref 0–0.4)
EOSINOPHIL NFR BLD: 5.1 % (ref 0–7)
ERYTHROCYTE [DISTWIDTH] IN BLOOD BY AUTOMATED COUNT: 15.6 % (ref 11.5–14.5)
GLOBULIN SER CALC-MCNC: 3.5 G/DL (ref 2–4)
GLUCOSE BLD STRIP.AUTO-MCNC: 189 MG/DL (ref 65–117)
GLUCOSE SERPL-MCNC: 191 MG/DL (ref 65–100)
HCO3 BLDV-SCNC: 23.7 MMOL/L (ref 23–28)
HCT VFR BLD AUTO: 37 % (ref 36.6–50.3)
HGB BLD-MCNC: 11.6 G/DL (ref 12.1–17)
IMM GRANULOCYTES # BLD AUTO: 0.02 K/UL (ref 0–0.04)
IMM GRANULOCYTES NFR BLD AUTO: 0.4 % (ref 0–0.5)
LACTATE BLD-SCNC: 0.84 MMOL/L (ref 0.4–2)
LIPASE SERPL-CCNC: 25 U/L (ref 13–75)
LYMPHOCYTES # BLD: 1.3 K/UL (ref 0.8–3.5)
LYMPHOCYTES NFR BLD: 22.9 % (ref 12–49)
MCH RBC QN AUTO: 27.4 PG (ref 26–34)
MCHC RBC AUTO-ENTMCNC: 31.4 G/DL (ref 30–36.5)
MCV RBC AUTO: 87.3 FL (ref 80–99)
MONOCYTES # BLD: 0.4 K/UL (ref 0–1)
MONOCYTES NFR BLD: 7.1 % (ref 5–13)
NEUTS SEG # BLD: 3.57 K/UL (ref 1.8–8)
NEUTS SEG NFR BLD: 62.9 % (ref 32–75)
NRBC # BLD: 0.04 K/UL (ref 0–0.01)
NRBC BLD-RTO: 0.7 PER 100 WBC
PCO2 BLDV: 48.2 MMHG (ref 41–51)
PH BLDV: 7.3 (ref 7.32–7.42)
PLATELET # BLD AUTO: 214 K/UL (ref 150–400)
PMV BLD AUTO: 10.1 FL (ref 8.9–12.9)
PO2 BLDV: 76 MMHG (ref 25–40)
POTASSIUM SERPL-SCNC: 4.5 MMOL/L (ref 3.5–5.1)
PROT SERPL-MCNC: 7.7 G/DL (ref 6.4–8.2)
RBC # BLD AUTO: 4.24 M/UL (ref 4.1–5.7)
SAO2 % BLDV: 93.5 % (ref 65–88)
SERVICE CMNT-IMP: ABNORMAL
SERVICE CMNT-IMP: ABNORMAL
SODIUM SERPL-SCNC: 136 MMOL/L (ref 136–145)
SPECIMEN HOLD: NORMAL
SPECIMEN TYPE: ABNORMAL
WBC # BLD AUTO: 5.7 K/UL (ref 4.1–11.1)

## 2025-06-09 PROCEDURE — 85025 COMPLETE CBC W/AUTO DIFF WBC: CPT

## 2025-06-09 PROCEDURE — 6360000002 HC RX W HCPCS: Performed by: EMERGENCY MEDICINE

## 2025-06-09 PROCEDURE — 83605 ASSAY OF LACTIC ACID: CPT

## 2025-06-09 PROCEDURE — 99284 EMERGENCY DEPT VISIT MOD MDM: CPT

## 2025-06-09 PROCEDURE — 82962 GLUCOSE BLOOD TEST: CPT

## 2025-06-09 PROCEDURE — 80053 COMPREHEN METABOLIC PANEL: CPT

## 2025-06-09 PROCEDURE — 74018 RADEX ABDOMEN 1 VIEW: CPT

## 2025-06-09 PROCEDURE — 82803 BLOOD GASES ANY COMBINATION: CPT

## 2025-06-09 PROCEDURE — 96374 THER/PROPH/DIAG INJ IV PUSH: CPT

## 2025-06-09 PROCEDURE — 83690 ASSAY OF LIPASE: CPT

## 2025-06-09 RX ORDER — ACETAMINOPHEN 500 MG
1000 TABLET ORAL EVERY 6 HOURS PRN
Qty: 40 TABLET | Refills: 0 | Status: SHIPPED | OUTPATIENT
Start: 2025-06-09

## 2025-06-09 RX ORDER — POLYETHYLENE GLYCOL 3350 17 G/17G
POWDER, FOR SOLUTION ORAL
Qty: 500 G | Refills: 0 | Status: SHIPPED | OUTPATIENT
Start: 2025-06-09

## 2025-06-09 RX ORDER — DROPERIDOL 2.5 MG/ML
2.5 INJECTION, SOLUTION INTRAMUSCULAR; INTRAVENOUS ONCE
Status: COMPLETED | OUTPATIENT
Start: 2025-06-09 | End: 2025-06-09

## 2025-06-09 RX ORDER — PROMETHAZINE HYDROCHLORIDE 25 MG/1
25 SUPPOSITORY RECTAL EVERY 6 HOURS PRN
Qty: 12 SUPPOSITORY | Refills: 0 | Status: SHIPPED | OUTPATIENT
Start: 2025-06-09 | End: 2025-06-16

## 2025-06-09 RX ORDER — DICYCLOMINE HCL 20 MG
20 TABLET ORAL EVERY 6 HOURS PRN
Qty: 20 TABLET | Refills: 0 | Status: SHIPPED | OUTPATIENT
Start: 2025-06-09

## 2025-06-09 RX ORDER — DOCUSATE SODIUM 100 MG/1
100 CAPSULE, LIQUID FILLED ORAL 2 TIMES DAILY
Qty: 60 CAPSULE | Refills: 0 | Status: SHIPPED | OUTPATIENT
Start: 2025-06-09 | End: 2025-07-09

## 2025-06-09 RX ADMIN — DROPERIDOL 2.5 MG: 2.5 INJECTION, SOLUTION INTRAMUSCULAR; INTRAVENOUS at 02:07

## 2025-06-09 ASSESSMENT — PAIN DESCRIPTION - DESCRIPTORS: DESCRIPTORS: CRAMPING

## 2025-06-09 ASSESSMENT — PAIN - FUNCTIONAL ASSESSMENT: PAIN_FUNCTIONAL_ASSESSMENT: 0-10

## 2025-06-09 ASSESSMENT — PAIN SCALES - GENERAL: PAINLEVEL_OUTOF10: 10

## 2025-06-09 ASSESSMENT — PAIN DESCRIPTION - ORIENTATION: ORIENTATION: UPPER

## 2025-06-09 ASSESSMENT — PAIN DESCRIPTION - LOCATION: LOCATION: ABDOMEN

## 2025-06-09 ASSESSMENT — PAIN DESCRIPTION - PAIN TYPE: TYPE: ACUTE PAIN

## 2025-06-09 NOTE — ED TRIAGE NOTES
Pt c/o upper abdominal pain and emesis since Wednesday. Denied diarrhea Pt is [facility] dialysis and hasn't missed tx. Pt took zofran and tylenol at home without relief.

## 2025-06-10 NOTE — ED PROVIDER NOTES
expected or if other new symptoms or signs of concern develop, other etiologies or diagnoses may need to be considered requiring other tests, treatments, consultations, and/or admission. The diagnosis, plan, expected course, follow-up, and return precautions were discussed and all questions were answered.    DIAGNOSIS  Primary Diagnosis: Constipation    ED Triage Vitals [06/09/25 0138]   BP Systolic BP Percentile Diastolic BP Percentile Temp Temp src Pulse Respirations SpO2   (!) 180/94 -- -- 98.1 °F (36.7 °C) -- 83 15 99 %      Height Weight - Scale         1.753 m (5' 9\") 66 kg (145 lb 8.1 oz)             CURRENT MEDICATIONS       Discharge Medication List as of 6/9/2025  3:48 AM        CONTINUE these medications which have NOT CHANGED    Details   amLODIPine (NORVASC) 5 MG tablet Take 1 tablet by mouth dailyHistorical Med      insulin aspart (NOVOLOG FLEXPEN) 100 UNIT/ML injection pen INJECT 8-12 UNITS subcutaneously WITH MEALS (max DOSE OF 50 units/day)Historical Med      insulin glargine (LANTUS SOLOSTAR) 100 UNIT/ML injection pen Inject 16 Units into the skin dailyHistorical Med      insulin 70-30 (HUMULIN;NOVOLIN) (70-30) 100 UNIT per ML injection vial Inject 40 Units into the skinHistorical Med      labetalol (NORMODYNE) 200 MG tablet Take 1 tablet by mouth 2 times dailyHistorical Med      ondansetron (ZOFRAN) 4 MG tablet Take 1 tablet by mouth every 8 hours as neededHistorical Med      sevelamer (RENVELA) 800 MG tablet Take 1 tablet by mouth 2 times dailyHistorical Med      torsemide (DEMADEX) 100 MG tablet Take 1 tablet by mouth 2 times dailyHistorical Med             REASSESSMENT     Vitals:    06/09/25 0138 06/09/25 0412   BP: (!) 180/94    Pulse: 83 79   Resp: 15    Temp: 98.1 °F (36.7 °C)    SpO2: 99% 99%   Weight: 66 kg (145 lb 8.1 oz)    Height: 1.753 m (5' 9\")           EKG: All EKG's are interpreted by the Emergency Department Physician who either signs or Co-signs this chart in the absence of a